# Patient Record
Sex: MALE
[De-identification: names, ages, dates, MRNs, and addresses within clinical notes are randomized per-mention and may not be internally consistent; named-entity substitution may affect disease eponyms.]

---

## 2018-12-14 ENCOUNTER — HOSPITAL ENCOUNTER (EMERGENCY)
Dept: HOSPITAL 92 - ERS | Age: 38
Discharge: HOME | End: 2018-12-14
Payer: SELF-PAY

## 2018-12-14 DIAGNOSIS — S90.32XA: Primary | ICD-10-CM
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
## 2018-12-14 DIAGNOSIS — F90.9: ICD-10-CM

## 2018-12-14 PROCEDURE — 96372 THER/PROPH/DIAG INJ SC/IM: CPT

## 2018-12-14 NOTE — RAD
LEFT FOOT:

12/14/18

 

Three views.

 

HISTORY: 

Foot pain.

 

Small enthesophytes on the plantar calcaneus. No evidence of fracture identified. 

 

IMPRESSION:  

No evidence of acute fracture. 

 

POS: [facility]

## 2020-03-12 ENCOUNTER — HOSPITAL ENCOUNTER (EMERGENCY)
Dept: HOSPITAL 92 - ERS | Age: 40
Discharge: HOME | End: 2020-03-12
Payer: SELF-PAY

## 2020-03-12 DIAGNOSIS — J44.9: ICD-10-CM
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
## 2020-03-12 DIAGNOSIS — F43.10: ICD-10-CM

## 2020-03-12 DIAGNOSIS — F17.210: ICD-10-CM

## 2020-03-12 DIAGNOSIS — S68.111A: Primary | ICD-10-CM

## 2020-03-12 DIAGNOSIS — Z79.82: ICD-10-CM

## 2020-03-12 DIAGNOSIS — F98.8: ICD-10-CM

## 2020-03-12 DIAGNOSIS — F29: ICD-10-CM

## 2020-03-12 DIAGNOSIS — F41.9: ICD-10-CM

## 2020-03-12 LAB
ALBUMIN SERPL BCG-MCNC: 4.3 G/DL (ref 3.5–5)
ALP SERPL-CCNC: 81 U/L (ref 40–110)
ALT SERPL W P-5'-P-CCNC: 13 U/L (ref 8–55)
ANION GAP SERPL CALC-SCNC: 13 MMOL/L (ref 10–20)
AST SERPL-CCNC: 12 U/L (ref 5–34)
BASOPHILS # BLD AUTO: 0.1 THOU/UL (ref 0–0.2)
BASOPHILS NFR BLD AUTO: 0.8 % (ref 0–1)
BILIRUB SERPL-MCNC: 0.4 MG/DL (ref 0.2–1.2)
BUN SERPL-MCNC: 10 MG/DL (ref 8.9–20.6)
CALCIUM SERPL-MCNC: 9.5 MG/DL (ref 7.8–10.44)
CHLORIDE SERPL-SCNC: 107 MMOL/L (ref 98–107)
CO2 SERPL-SCNC: 23 MMOL/L (ref 22–29)
CREAT CL PREDICTED SERPL C-G-VRATE: 0 ML/MIN (ref 70–130)
EOSINOPHIL # BLD AUTO: 0.2 THOU/UL (ref 0–0.7)
EOSINOPHIL NFR BLD AUTO: 1.5 % (ref 0–10)
GLOBULIN SER CALC-MCNC: 2.7 G/DL (ref 2.4–3.5)
GLUCOSE SERPL-MCNC: 96 MG/DL (ref 70–105)
HGB BLD-MCNC: 16.4 G/DL (ref 14–18)
LYMPHOCYTES # BLD: 2.4 THOU/UL (ref 1.2–3.4)
LYMPHOCYTES NFR BLD AUTO: 18.8 % (ref 21–51)
MCH RBC QN AUTO: 32.2 PG (ref 27–31)
MCV RBC AUTO: 94.3 FL (ref 78–98)
MONOCYTES # BLD AUTO: 1.1 THOU/UL (ref 0.11–0.59)
MONOCYTES NFR BLD AUTO: 8.6 % (ref 0–10)
NEUTROPHILS # BLD AUTO: 9.1 THOU/UL (ref 1.4–6.5)
NEUTROPHILS NFR BLD AUTO: 70.4 % (ref 42–75)
PLATELET # BLD AUTO: 346 THOU/UL (ref 130–400)
POTASSIUM SERPL-SCNC: 3.9 MMOL/L (ref 3.5–5.1)
RBC # BLD AUTO: 5.1 MILL/UL (ref 4.7–6.1)
SODIUM SERPL-SCNC: 139 MMOL/L (ref 136–145)
WBC # BLD AUTO: 12.9 THOU/UL (ref 4.8–10.8)

## 2020-03-12 PROCEDURE — 36415 COLL VENOUS BLD VENIPUNCTURE: CPT

## 2020-03-12 PROCEDURE — 80053 COMPREHEN METABOLIC PANEL: CPT

## 2020-03-12 PROCEDURE — 85025 COMPLETE CBC W/AUTO DIFF WBC: CPT

## 2020-03-12 PROCEDURE — 93005 ELECTROCARDIOGRAM TRACING: CPT

## 2020-03-12 NOTE — RAD
XR Hand Lt 3 View STANDARD



History: Injury. Amputation of the left pointer finger.



Comparison: None.



Findings: Soft tissue amputation of the distal phalanx index finger without osseous involvement. No a
cute fracture. No radiopaque foreign object appreciated.



Impression: Soft tissue amputation distal to the tuft index finger distal phalanx.



Reported By: Evaristo Lee 

Electronically Signed:  3/12/2020 3:38 PM

## 2020-03-14 NOTE — EKG
Test Reason : 

Blood Pressure : ***/*** mmHG

Vent. Rate : 079 BPM     Atrial Rate : 079 BPM

   P-R Int : 134 ms          QRS Dur : 078 ms

    QT Int : 354 ms       P-R-T Axes : 044 -06 036 degrees

   QTc Int : 405 ms

 

Normal sinus rhythm with sinus arrhythmia

Cannot rule out Inferior infarct , age undetermined

Abnormal ECG

 

Confirmed by EMMANUEL ESCOBEDO M.D. ([id number]),  TENA WRAY ([id number]) on 3/14/2020 3:39:42 PM

 

Referred By:             Confirmed By:EMMANUEL ESCOBEDO M.D.

## 2020-11-27 ENCOUNTER — HOSPITAL ENCOUNTER (EMERGENCY)
Dept: HOSPITAL 92 - ERS | Age: 40
Discharge: HOME | End: 2020-11-27
Payer: SELF-PAY

## 2020-11-27 DIAGNOSIS — F43.10: ICD-10-CM

## 2020-11-27 DIAGNOSIS — F17.210: ICD-10-CM

## 2020-11-27 DIAGNOSIS — J44.9: ICD-10-CM
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
## 2020-11-27 DIAGNOSIS — Z79.899: ICD-10-CM

## 2020-11-27 DIAGNOSIS — F41.9: ICD-10-CM

## 2020-11-27 DIAGNOSIS — S20.211A: Primary | ICD-10-CM

## 2020-11-27 DIAGNOSIS — M79.601: ICD-10-CM

## 2020-11-27 PROCEDURE — 96372 THER/PROPH/DIAG INJ SC/IM: CPT

## 2020-11-27 NOTE — RAD
EXAM: Right rib series with chest x-ray



HISTORY: Rib pain after trauma



COMPARISON: None



FINDINGS: Single view of the chest shows a normal sized cardiomediastinal silhouette. There is no ros
dence of consolidation, mass, or pleural effusion. 



Multiple views of the right ribs shows no evidence of displaced rib fracture. No underlying pleural t
hickening or pneumothorax are seen.



IMPRESSION:

1. No evidence of displaced rib fracture.

2. No evidence of acute cardiopulmonary disease.



Reported By: Chino Alfaro 

Electronically Signed:  11/27/2020 1:20 PM

## 2020-11-27 NOTE — RAD
EXAM: 3 views of the left wrist



HISTORY: Wrist pain after trauma



COMPARISON: None



FINDINGS: 3 views of the left wrist shows no evidence of acute fracture or dislocation. Mild soft tis
edgardo swelling is seen. No degenerative changes are present.



IMPRESSION: No evidence of acute osseous abnormality.



Reported By: Chino Alfaro 

Electronically Signed:  11/27/2020 12:25 PM

## 2020-11-27 NOTE — RAD
EXAM: 2 views of the right forearm



HISTORY: Forearm pain after trauma



COMPARISON: None



FINDINGS: There is no evidence of acute fracture or dislocation. No soft tissue swelling is seen. No 
degenerative changes are seen in the wrist or elbow.



IMPRESSION: No evidence of acute osseous abnormality.



Reported By: Chino Alfaro 

Electronically Signed:  11/27/2020 12:25 PM